# Patient Record
Sex: FEMALE | Race: BLACK OR AFRICAN AMERICAN | NOT HISPANIC OR LATINO | URBAN - METROPOLITAN AREA
[De-identification: names, ages, dates, MRNs, and addresses within clinical notes are randomized per-mention and may not be internally consistent; named-entity substitution may affect disease eponyms.]

---

## 2017-05-05 ENCOUNTER — EMERGENCY (EMERGENCY)
Facility: HOSPITAL | Age: 62
LOS: 1 days | Discharge: HOME | End: 2017-05-05
Attending: EMERGENCY MEDICINE | Admitting: EMERGENCY MEDICINE
Payer: COMMERCIAL

## 2017-05-05 VITALS
OXYGEN SATURATION: 99 % | HEART RATE: 60 BPM | TEMPERATURE: 98 F | RESPIRATION RATE: 16 BRPM | SYSTOLIC BLOOD PRESSURE: 137 MMHG | DIASTOLIC BLOOD PRESSURE: 86 MMHG

## 2017-05-05 VITALS
DIASTOLIC BLOOD PRESSURE: 76 MMHG | RESPIRATION RATE: 18 BRPM | TEMPERATURE: 98 F | HEART RATE: 65 BPM | SYSTOLIC BLOOD PRESSURE: 146 MMHG

## 2017-05-05 DIAGNOSIS — Z90.710 ACQUIRED ABSENCE OF BOTH CERVIX AND UTERUS: Chronic | ICD-10-CM

## 2017-05-05 PROCEDURE — 74010: CPT | Mod: 26

## 2017-05-05 PROCEDURE — 99285 EMERGENCY DEPT VISIT HI MDM: CPT

## 2017-05-05 PROCEDURE — 72170 X-RAY EXAM OF PELVIS: CPT | Mod: 26

## 2017-05-05 RX ORDER — DIAZEPAM 5 MG
1 TABLET ORAL
Qty: 15 | Refills: 0 | OUTPATIENT
Start: 2017-05-05

## 2017-05-05 RX ORDER — LIDOCAINE 4 G/100G
1 CREAM TOPICAL ONCE
Qty: 0 | Refills: 0 | Status: COMPLETED | OUTPATIENT
Start: 2017-05-05 | End: 2017-05-05

## 2017-05-05 RX ORDER — FAMOTIDINE 10 MG/ML
40 INJECTION INTRAVENOUS ONCE
Qty: 0 | Refills: 0 | Status: COMPLETED | OUTPATIENT
Start: 2017-05-05 | End: 2017-05-05

## 2017-05-05 RX ORDER — OMEPRAZOLE 10 MG/1
1 CAPSULE, DELAYED RELEASE ORAL
Qty: 30 | Refills: 0 | OUTPATIENT
Start: 2017-05-05 | End: 2017-06-04

## 2017-05-05 RX ORDER — IBUPROFEN 200 MG
800 TABLET ORAL ONCE
Qty: 0 | Refills: 0 | Status: DISCONTINUED | OUTPATIENT
Start: 2017-05-05 | End: 2017-05-05

## 2017-05-05 RX ORDER — ACETAMINOPHEN 500 MG
650 TABLET ORAL ONCE
Qty: 0 | Refills: 0 | Status: COMPLETED | OUTPATIENT
Start: 2017-05-05 | End: 2017-05-05

## 2017-05-05 RX ADMIN — LIDOCAINE 1 PATCH: 4 CREAM TOPICAL at 11:06

## 2017-05-05 RX ADMIN — Medication 650 MILLIGRAM(S): at 11:10

## 2017-05-05 NOTE — ED PROVIDER NOTE - DETAILS:
Dr. Cruz:  I personally performed the services described in the documentation, reviewed the documentation recorded by the scribe in my presence and it accurately and completely records my words and action. The scribe's documentation has been prepared under my direction and personally reviewed by me in its entirety. I confirm that the note above accurately reflects all work, treatment, procedures, and medical decision making performed by me.

## 2017-05-05 NOTE — ED ADULT NURSE NOTE - OBJECTIVE STATEMENT
Patient received to room 6 awake, alert, oriented x3, able to make needs known verbally.  Patient complaining of pain 9/10 in severity in right buttock radiating down leg.  Patient ambulatory free from injury.  Pain meds received as per provider order.  No sign of acute distress at this time, will continue to monitor.

## 2017-05-05 NOTE — ED PROVIDER NOTE - MEDICAL DECISION MAKING DETAILS
60 yo F pt w/ Sciatica, without warning signs for Cord Compression. Plan: pain control, reassess. As per pt and pt's family's request - x-ray to r/o fx.

## 2017-05-05 NOTE — ED PROVIDER NOTE - CARE PLAN
Principal Discharge DX:	Sciatic mononeuropathy, right  Instructions for follow-up, activity and diet:	Seen in ED for right sided sciatica. Limit further injury, over exertion, and rest affected area. Take Tylenol 650 mg every 4 hours as needed for mild to moderate pain. Take Valium 5mg one tab every 8 hours as needed for severe pain. NEVER COMBINE WITH ALCOHOL. NEVER DRINK AND DRIVE ON VALIUM IT WILL CAUSE ACCIDENTS. Please continue all home medications as directed. See your regular doctor within 72 hours for follow-up care. Return to ER for new or worsening symptoms.

## 2017-05-05 NOTE — ED PROVIDER NOTE - OBJECTIVE STATEMENT
60 yo F pt w/ PMHx of HTN, Total Hysterectomy presents to the ED c/o 5 weeks of R buttock pain down to RLE. Denies trauma, MVC, fall, history of herniated disc or back surgery. For the past 5 weeks, notes a throbbing and cramping pain in lower R buttock extending to posterior RLE up to the knee. Denies urinary/fecal incontinence. Needs no assistance with gait. Is taking antiinflammatory, with no improvement. Also c/o - for the past 4 months, when she wakes up in the morning she feels a burning sensation in the lower abdomen. No FHXx. Nonsmoker. 62 yo F pt w/ PMHx of HTN, Total Hysterectomy presents to the ED c/o 5 weeks of R buttock pain down to RLE. Denies trauma, MVC, fall, history of herniated disc or back surgery. For the past 5 weeks, notes a throbbing and cramping pain in lower R buttock extending to posterior RLE up to the knee. Denies urinary/fecal incontinence. Needs no assistance with gait. Is taking antiinflammatory, with no improvement. Also c/o - for the past 4-5 months, when she wakes up in the morning she feels a burning sensation in the mid abdomen, non at present. No FHXx. Nonsmoker.

## 2017-05-05 NOTE — ED PROVIDER NOTE - PROGRESS NOTE DETAILS
Nancy att: requested xr neg fx, pt feeling better, ambulatory. DC instructions and return precautions d/w patient.

## 2017-05-05 NOTE — ED PROVIDER NOTE - PLAN OF CARE
Seen in ED for right sided sciatica. Limit further injury, over exertion, and rest affected area. Take Tylenol 650 mg every 4 hours as needed for mild to moderate pain. Take Valium 5mg one tab every 8 hours as needed for severe pain. NEVER COMBINE WITH ALCOHOL. NEVER DRINK AND DRIVE ON VALIUM IT WILL CAUSE ACCIDENTS. Please continue all home medications as directed. See your regular doctor within 72 hours for follow-up care. Return to ER for new or worsening symptoms.

## 2018-11-30 ENCOUNTER — EMERGENCY (EMERGENCY)
Facility: HOSPITAL | Age: 63
LOS: 1 days | Discharge: HOME | End: 2018-11-30
Attending: EMERGENCY MEDICINE | Admitting: EMERGENCY MEDICINE
Payer: COMMERCIAL

## 2018-11-30 VITALS
HEART RATE: 77 BPM | TEMPERATURE: 98 F | SYSTOLIC BLOOD PRESSURE: 154 MMHG | RESPIRATION RATE: 18 BRPM | OXYGEN SATURATION: 98 % | DIASTOLIC BLOOD PRESSURE: 90 MMHG

## 2018-11-30 DIAGNOSIS — Z90.710 ACQUIRED ABSENCE OF BOTH CERVIX AND UTERUS: Chronic | ICD-10-CM

## 2018-11-30 PROBLEM — I10 ESSENTIAL (PRIMARY) HYPERTENSION: Chronic | Status: ACTIVE | Noted: 2017-05-05

## 2018-11-30 LAB
ALBUMIN SERPL ELPH-MCNC: 4.5 G/DL — SIGNIFICANT CHANGE UP (ref 3.3–5)
ALP SERPL-CCNC: 90 U/L — SIGNIFICANT CHANGE UP (ref 40–120)
ALT FLD-CCNC: 22 U/L — SIGNIFICANT CHANGE UP (ref 4–33)
APPEARANCE UR: CLEAR — SIGNIFICANT CHANGE UP
APTT BLD: 29.6 SEC — SIGNIFICANT CHANGE UP (ref 27.5–36.3)
AST SERPL-CCNC: 23 U/L — SIGNIFICANT CHANGE UP (ref 4–32)
BACTERIA # UR AUTO: NEGATIVE — SIGNIFICANT CHANGE UP
BASE EXCESS BLDV CALC-SCNC: 1.1 MMOL/L — SIGNIFICANT CHANGE UP
BASE EXCESS BLDV CALC-SCNC: 2.6 MMOL/L — SIGNIFICANT CHANGE UP
BASE EXCESS BLDV CALC-SCNC: 3.8 MMOL/L — SIGNIFICANT CHANGE UP
BASOPHILS # BLD AUTO: 0.01 K/UL — SIGNIFICANT CHANGE UP (ref 0–0.2)
BASOPHILS NFR BLD AUTO: 0.2 % — SIGNIFICANT CHANGE UP (ref 0–2)
BILIRUB SERPL-MCNC: 0.7 MG/DL — SIGNIFICANT CHANGE UP (ref 0.2–1.2)
BILIRUB UR-MCNC: NEGATIVE — SIGNIFICANT CHANGE UP
BLOOD GAS VENOUS - CREATININE: 0.6 MG/DL — SIGNIFICANT CHANGE UP (ref 0.5–1.3)
BLOOD GAS VENOUS - CREATININE: 0.63 MG/DL — SIGNIFICANT CHANGE UP (ref 0.5–1.3)
BLOOD GAS VENOUS - CREATININE: SIGNIFICANT CHANGE UP MG/DL (ref 0.5–1.3)
BLOOD UR QL VISUAL: NEGATIVE — SIGNIFICANT CHANGE UP
BUN SERPL-MCNC: 12 MG/DL — SIGNIFICANT CHANGE UP (ref 7–23)
CALCIUM SERPL-MCNC: 9.1 MG/DL — SIGNIFICANT CHANGE UP (ref 8.4–10.5)
CHLORIDE BLDV-SCNC: 106 MMOL/L — SIGNIFICANT CHANGE UP (ref 96–108)
CHLORIDE BLDV-SCNC: 106 MMOL/L — SIGNIFICANT CHANGE UP (ref 96–108)
CHLORIDE BLDV-SCNC: 111 MMOL/L — HIGH (ref 96–108)
CHLORIDE SERPL-SCNC: 101 MMOL/L — SIGNIFICANT CHANGE UP (ref 98–107)
CO2 SERPL-SCNC: 24 MMOL/L — SIGNIFICANT CHANGE UP (ref 22–31)
COLOR SPEC: COLORLESS — SIGNIFICANT CHANGE UP
CREAT SERPL-MCNC: 0.78 MG/DL — SIGNIFICANT CHANGE UP (ref 0.5–1.3)
EOSINOPHIL # BLD AUTO: 0.02 K/UL — SIGNIFICANT CHANGE UP (ref 0–0.5)
EOSINOPHIL NFR BLD AUTO: 0.4 % — SIGNIFICANT CHANGE UP (ref 0–6)
GAS PNL BLDV: 139 MMOL/L — SIGNIFICANT CHANGE UP (ref 136–146)
GAS PNL BLDV: 140 MMOL/L — SIGNIFICANT CHANGE UP (ref 136–146)
GAS PNL BLDV: 142 MMOL/L — SIGNIFICANT CHANGE UP (ref 136–146)
GLUCOSE BLDV-MCNC: 114 — HIGH (ref 70–99)
GLUCOSE BLDV-MCNC: 120 — HIGH (ref 70–99)
GLUCOSE BLDV-MCNC: 94 — SIGNIFICANT CHANGE UP (ref 70–99)
GLUCOSE SERPL-MCNC: 117 MG/DL — HIGH (ref 70–99)
GLUCOSE UR-MCNC: NEGATIVE — SIGNIFICANT CHANGE UP
HCO3 BLDV-SCNC: 24 MMOL/L — SIGNIFICANT CHANGE UP (ref 20–27)
HCO3 BLDV-SCNC: 25 MMOL/L — SIGNIFICANT CHANGE UP (ref 20–27)
HCO3 BLDV-SCNC: 27 MMOL/L — SIGNIFICANT CHANGE UP (ref 20–27)
HCT VFR BLD CALC: 38.6 % — SIGNIFICANT CHANGE UP (ref 34.5–45)
HCT VFR BLDV CALC: 34.2 % — LOW (ref 34.5–45)
HCT VFR BLDV CALC: 35.4 % — SIGNIFICANT CHANGE UP (ref 34.5–45)
HCT VFR BLDV CALC: 38.6 % — SIGNIFICANT CHANGE UP (ref 34.5–45)
HGB BLD-MCNC: 12.5 G/DL — SIGNIFICANT CHANGE UP (ref 11.5–15.5)
HGB BLDV-MCNC: 11.1 G/DL — LOW (ref 11.5–15.5)
HGB BLDV-MCNC: 11.5 G/DL — SIGNIFICANT CHANGE UP (ref 11.5–15.5)
HGB BLDV-MCNC: 12.6 G/DL — SIGNIFICANT CHANGE UP (ref 11.5–15.5)
HYALINE CASTS # UR AUTO: NEGATIVE — SIGNIFICANT CHANGE UP
IMM GRANULOCYTES # BLD AUTO: 0.04 # — SIGNIFICANT CHANGE UP
IMM GRANULOCYTES NFR BLD AUTO: 0.8 % — SIGNIFICANT CHANGE UP (ref 0–1.5)
INR BLD: 1.03 — SIGNIFICANT CHANGE UP (ref 0.88–1.17)
KETONES UR-MCNC: NEGATIVE — SIGNIFICANT CHANGE UP
LACTATE BLDV-MCNC: 1.3 MMOL/L — SIGNIFICANT CHANGE UP (ref 0.5–2)
LACTATE BLDV-MCNC: 2.2 MMOL/L — HIGH (ref 0.5–2)
LACTATE BLDV-MCNC: 3.3 MMOL/L — HIGH (ref 0.5–2)
LEUKOCYTE ESTERASE UR-ACNC: SIGNIFICANT CHANGE UP
LIDOCAIN IGE QN: 16.7 U/L — SIGNIFICANT CHANGE UP (ref 7–60)
LYMPHOCYTES # BLD AUTO: 1.83 K/UL — SIGNIFICANT CHANGE UP (ref 1–3.3)
LYMPHOCYTES # BLD AUTO: 38.4 % — SIGNIFICANT CHANGE UP (ref 13–44)
MCHC RBC-ENTMCNC: 29.7 PG — SIGNIFICANT CHANGE UP (ref 27–34)
MCHC RBC-ENTMCNC: 32.4 % — SIGNIFICANT CHANGE UP (ref 32–36)
MCV RBC AUTO: 91.7 FL — SIGNIFICANT CHANGE UP (ref 80–100)
MONOCYTES # BLD AUTO: 0.38 K/UL — SIGNIFICANT CHANGE UP (ref 0–0.9)
MONOCYTES NFR BLD AUTO: 8 % — SIGNIFICANT CHANGE UP (ref 2–14)
NEUTROPHILS # BLD AUTO: 2.48 K/UL — SIGNIFICANT CHANGE UP (ref 1.8–7.4)
NEUTROPHILS NFR BLD AUTO: 52.2 % — SIGNIFICANT CHANGE UP (ref 43–77)
NITRITE UR-MCNC: NEGATIVE — SIGNIFICANT CHANGE UP
NRBC # FLD: 0 — SIGNIFICANT CHANGE UP
PCO2 BLDV: 39 MMHG — LOW (ref 41–51)
PCO2 BLDV: 54 MMHG — HIGH (ref 41–51)
PCO2 BLDV: 55 MMHG — HIGH (ref 41–51)
PH BLDV: 7.31 PH — LOW (ref 7.32–7.43)
PH BLDV: 7.34 PH — SIGNIFICANT CHANGE UP (ref 7.32–7.43)
PH BLDV: 7.46 PH — HIGH (ref 7.32–7.43)
PH UR: 8.5 — HIGH (ref 5–8)
PLATELET # BLD AUTO: 213 K/UL — SIGNIFICANT CHANGE UP (ref 150–400)
PMV BLD: 10.5 FL — SIGNIFICANT CHANGE UP (ref 7–13)
PO2 BLDV: 29 MMHG — LOW (ref 35–40)
PO2 BLDV: 31 MMHG — LOW (ref 35–40)
PO2 BLDV: 33 MMHG — LOW (ref 35–40)
POTASSIUM BLDV-SCNC: 3.1 MMOL/L — LOW (ref 3.4–4.5)
POTASSIUM BLDV-SCNC: 3.1 MMOL/L — LOW (ref 3.4–4.5)
POTASSIUM BLDV-SCNC: 3.7 MMOL/L — SIGNIFICANT CHANGE UP (ref 3.4–4.5)
POTASSIUM SERPL-MCNC: 3.3 MMOL/L — LOW (ref 3.5–5.3)
POTASSIUM SERPL-SCNC: 3.3 MMOL/L — LOW (ref 3.5–5.3)
PROT SERPL-MCNC: 8.3 G/DL — SIGNIFICANT CHANGE UP (ref 6–8.3)
PROT UR-MCNC: NEGATIVE — SIGNIFICANT CHANGE UP
PROTHROM AB SERPL-ACNC: 11.5 SEC — SIGNIFICANT CHANGE UP (ref 9.8–13.1)
RBC # BLD: 4.21 M/UL — SIGNIFICANT CHANGE UP (ref 3.8–5.2)
RBC # FLD: 13.7 % — SIGNIFICANT CHANGE UP (ref 10.3–14.5)
RBC CASTS # UR COMP ASSIST: SIGNIFICANT CHANGE UP (ref 0–?)
SAO2 % BLDV: 39 % — LOW (ref 60–85)
SAO2 % BLDV: 54.5 % — LOW (ref 60–85)
SAO2 % BLDV: 54.8 % — LOW (ref 60–85)
SODIUM SERPL-SCNC: 141 MMOL/L — SIGNIFICANT CHANGE UP (ref 135–145)
SP GR SPEC: 1.01 — SIGNIFICANT CHANGE UP (ref 1–1.04)
SQUAMOUS # UR AUTO: SIGNIFICANT CHANGE UP
UROBILINOGEN FLD QL: NORMAL — SIGNIFICANT CHANGE UP
WBC # BLD: 4.76 K/UL — SIGNIFICANT CHANGE UP (ref 3.8–10.5)
WBC # FLD AUTO: 4.76 K/UL — SIGNIFICANT CHANGE UP (ref 3.8–10.5)
WBC UR QL: SIGNIFICANT CHANGE UP (ref 0–?)

## 2018-11-30 PROCEDURE — 74174 CTA ABD&PLVS W/CONTRAST: CPT | Mod: 26

## 2018-11-30 PROCEDURE — 74019 RADEX ABDOMEN 2 VIEWS: CPT | Mod: 26

## 2018-11-30 PROCEDURE — 99218: CPT

## 2018-11-30 RX ORDER — SODIUM CHLORIDE 9 MG/ML
1000 INJECTION INTRAMUSCULAR; INTRAVENOUS; SUBCUTANEOUS ONCE
Qty: 0 | Refills: 0 | Status: COMPLETED | OUTPATIENT
Start: 2018-11-30 | End: 2018-11-30

## 2018-11-30 RX ORDER — FAMOTIDINE 10 MG/ML
20 INJECTION INTRAVENOUS EVERY 12 HOURS
Qty: 0 | Refills: 0 | Status: DISCONTINUED | OUTPATIENT
Start: 2018-11-30 | End: 2018-12-04

## 2018-11-30 RX ORDER — MORPHINE SULFATE 50 MG/1
4 CAPSULE, EXTENDED RELEASE ORAL ONCE
Qty: 0 | Refills: 0 | Status: DISCONTINUED | OUTPATIENT
Start: 2018-11-30 | End: 2018-11-30

## 2018-11-30 RX ORDER — SODIUM CHLORIDE 9 MG/ML
1000 INJECTION, SOLUTION INTRAVENOUS
Qty: 0 | Refills: 0 | Status: DISCONTINUED | OUTPATIENT
Start: 2018-11-30 | End: 2018-12-04

## 2018-11-30 RX ORDER — METOCLOPRAMIDE HCL 10 MG
10 TABLET ORAL ONCE
Qty: 0 | Refills: 0 | Status: COMPLETED | OUTPATIENT
Start: 2018-11-30 | End: 2018-11-30

## 2018-11-30 RX ORDER — ONDANSETRON 8 MG/1
4 TABLET, FILM COATED ORAL ONCE
Qty: 0 | Refills: 0 | Status: COMPLETED | OUTPATIENT
Start: 2018-11-30 | End: 2018-11-30

## 2018-11-30 RX ORDER — ONDANSETRON 8 MG/1
4 TABLET, FILM COATED ORAL EVERY 4 HOURS
Qty: 0 | Refills: 0 | Status: DISCONTINUED | OUTPATIENT
Start: 2018-11-30 | End: 2018-12-04

## 2018-11-30 RX ORDER — POTASSIUM CHLORIDE 20 MEQ
40 PACKET (EA) ORAL ONCE
Qty: 0 | Refills: 0 | Status: COMPLETED | OUTPATIENT
Start: 2018-11-30 | End: 2018-11-30

## 2018-11-30 RX ORDER — SIMETHICONE 80 MG/1
80 TABLET, CHEWABLE ORAL ONCE
Qty: 0 | Refills: 0 | Status: COMPLETED | OUTPATIENT
Start: 2018-11-30 | End: 2018-11-30

## 2018-11-30 RX ORDER — ACETAMINOPHEN 500 MG
1000 TABLET ORAL ONCE
Qty: 0 | Refills: 0 | Status: COMPLETED | OUTPATIENT
Start: 2018-11-30 | End: 2018-11-30

## 2018-11-30 RX ORDER — FAMOTIDINE 10 MG/ML
20 INJECTION INTRAVENOUS ONCE
Qty: 0 | Refills: 0 | Status: DISCONTINUED | OUTPATIENT
Start: 2018-11-30 | End: 2018-11-30

## 2018-11-30 RX ORDER — FAMOTIDINE 10 MG/ML
20 INJECTION INTRAVENOUS ONCE
Qty: 0 | Refills: 0 | Status: COMPLETED | OUTPATIENT
Start: 2018-11-30 | End: 2018-11-30

## 2018-11-30 RX ADMIN — SIMETHICONE 80 MILLIGRAM(S): 80 TABLET, CHEWABLE ORAL at 19:05

## 2018-11-30 RX ADMIN — SODIUM CHLORIDE 1000 MILLILITER(S): 9 INJECTION INTRAMUSCULAR; INTRAVENOUS; SUBCUTANEOUS at 13:13

## 2018-11-30 RX ADMIN — Medication 400 MILLIGRAM(S): at 23:42

## 2018-11-30 RX ADMIN — MORPHINE SULFATE 4 MILLIGRAM(S): 50 CAPSULE, EXTENDED RELEASE ORAL at 09:10

## 2018-11-30 RX ADMIN — SODIUM CHLORIDE 1000 MILLILITER(S): 9 INJECTION INTRAMUSCULAR; INTRAVENOUS; SUBCUTANEOUS at 09:56

## 2018-11-30 RX ADMIN — ONDANSETRON 4 MILLIGRAM(S): 8 TABLET, FILM COATED ORAL at 08:49

## 2018-11-30 RX ADMIN — Medication 30 MILLILITER(S): at 08:49

## 2018-11-30 RX ADMIN — MORPHINE SULFATE 4 MILLIGRAM(S): 50 CAPSULE, EXTENDED RELEASE ORAL at 11:35

## 2018-11-30 RX ADMIN — MORPHINE SULFATE 4 MILLIGRAM(S): 50 CAPSULE, EXTENDED RELEASE ORAL at 08:48

## 2018-11-30 RX ADMIN — Medication 10 MILLIGRAM(S): at 19:05

## 2018-11-30 RX ADMIN — SODIUM CHLORIDE 150 MILLILITER(S): 9 INJECTION, SOLUTION INTRAVENOUS at 15:08

## 2018-11-30 RX ADMIN — MORPHINE SULFATE 4 MILLIGRAM(S): 50 CAPSULE, EXTENDED RELEASE ORAL at 11:50

## 2018-11-30 RX ADMIN — FAMOTIDINE 20 MILLIGRAM(S): 10 INJECTION INTRAVENOUS at 08:46

## 2018-11-30 RX ADMIN — SODIUM CHLORIDE 1000 MILLILITER(S): 9 INJECTION INTRAMUSCULAR; INTRAVENOUS; SUBCUTANEOUS at 08:48

## 2018-11-30 RX ADMIN — Medication 40 MILLIEQUIVALENT(S): at 10:18

## 2018-11-30 NOTE — CONSULT NOTE ADULT - ASSESSMENT
63F PMH HTN, s/p hysterectomy, appendectomy (Both over 20 years ago), presenting with abdominal pain, found on imaging to have ileus vs. partial SBO.    - Patient now passing flatus, denying nausea - no indication for NGT at the time  - Recommend rechecking Lactate after further IVF resuscitation  - If Lactate clears would PO challenge and discharge home if tolerating  - Please notify General surgery if pt fails PO challenge/ has increasing pain/Nausea in the interim  - Discussed with attending Dr. Elida Lemus PGY2  B team Surgery  [id number]

## 2018-11-30 NOTE — ED CDU PROVIDER INITIAL DAY NOTE - PROGRESS NOTE DETAILS
Improving abd pain c/o, soft abd, NT to palpation, points to periumbilical area, no amrita/guarding. Pt. states passing gas but unlikely to have bm in ED, no N/V, will attempt po trial. Pt. with inc. nausea, low abd cramping and retching with po liquids. Will stop po trial and reassess, poss. Surgery re-eval. This patient was signed out to me by day CDU KAVITHA Morales and attending Dr. Dhillon at 1900 hrs.; test results reviewed.  At time of sign-out, pt was retching and belching frequently; was s/p PO trial.  Pt. was given Reglan and simethicone during sign-out, and pt was observed to cease retching and frequency of belching decreased.  In the interim, pt objectively noted to be resting, with NAD; abdominal x-ray was performed which shows dilated bowel in the right abdomen and an air-fluid level in the LUQ.  I reassessed pt on return from x-ray; pt states she still feels non-radiating mid abdominal pain, 6/10 in severity, and states unchanged from prior.  On exam, pt has hypoactive BS throughout, abdomen appears mildly protuberant, abdomen is soft with no objective (or subjective) TTP on either light or deep palpation.  No pulsations or palpable masses detected.  I discussed the case with the surgical resident answering the [phone] B team surgery pager; she advised to give IV Tylenol for pain and stated she will discuss with the team that evaluated the patient earlier and get back to me.  Pt. is stable at the present time.  I discussed all of the above with the patient.  Will continue to monitor.  Pt. will be kept NPO for now. Pt was reassessed bedside by the surgical team; surgical resident advised continued monitoring overnight; team will reassess pt in am; agrees with current CDU plan.  Pt. stable at present; objectively comfortable appearing at present.

## 2018-11-30 NOTE — ED CDU PROVIDER INITIAL DAY NOTE - MEDICAL DECISION MAKING DETAILS
64 y/o female abd pain x1 day, CT concerning for ileus vs partial SBO- pain control, fluids, PO trial, reassess

## 2018-11-30 NOTE — ED PROVIDER NOTE - PROGRESS NOTE DETAILS
Javier: surgery consulted. Javier: surgery recommending fluids and repeat lactate, po challenge. Spoke with CDU regarding observation, CDU PA will come assess patient.

## 2018-11-30 NOTE — ED CDU PROVIDER INITIAL DAY NOTE - OBJECTIVE STATEMENT
62 y/o female pmh htn, pshx appendectomy, hysterectomy, c/o abd pain x1 day. Pt admits to sudden onset abd pain which woke her up from sleep. Pt admits to constant, mid abd pain, non radiating, no aggravating or relieving factors. Pt admits to nausea. Pt admits to having small BM yesterday, last normal BM x2 days ago. Pt admits to passing gas today. pt denies chest pain, sob, vomiting, numbness, tingling, weakness, dizziness, syncope, fever or chills.  Pt sen and examined by surgery in ER for CT findings of ileus vs partial SBO. Pt is non tender with no emesis and passing flatus. Pt sent to CDU for serial abd exams, IV fluids, repeat labs and PO trial

## 2018-11-30 NOTE — ED PROVIDER NOTE - PHYSICAL EXAMINATION
GENERAL: in distress, moaning in pain, but responding to questions appropriately  HEAD: NCAT  HEENT: Normal conjunctiva, oral mucosa moist  CARDIAC: RRR, no murmurs  PULM: CTAB, no crackles, rales, rhonchi, or wheezing  GI: Abdomen nondistended, soft, nontender, no guarding or rebound tenderness; + BS  NEURO: AAO x 3, PERRLA, EOMI, no focal motor or sensory deficits  MSK: Moving 4 extremities, no visible deformities  SKIN: No visible rashes, dry, well-perfused  PYSCH: Appropriate mood and affect

## 2018-11-30 NOTE — CONSULT NOTE ADULT - SUBJECTIVE AND OBJECTIVE BOX
General Surgery Consult  Consulting surgical team: B team surgery  Consulting attending: Dr. Marrero    HPI:  63F PMH HTN, s/p hysterectomy, appendectomy (Both over 20 years ago), presenting with abdominal pain. Patient said she developed abdominal pain last night while she was sitting and watching TV. Has never had pain like this before. Pain worse in the lower abdomen, and gradually worsened overnight, becoming most severe this morning and prompting presentation to [facility] ED. Endorses having nausea at home but nothing since presentation. Denies emesis. Last BM was yesterday, which was smaller in size than usual (of note, patient regularly takes Metamucil at home). Denies fevers, chills.     Upon arrival to [facility] ED, AVSS. WBC 4.76. Lactate 3.3, which improved to 2.2 after 1L NS bolus. CT Angio as obtained due to concern for mesenteric ischemia, which was negative for ischemia but showed ileus vs. partial SBO with possible transition point in RUQ. At time of exam pt passing flatus.     PAST MEDICAL HISTORY:  HTN (hypertension)      PAST SURGICAL HISTORY:  History of total hysterectomy  Appendectomy      MEDICATIONS:      ALLERGIES:  No Known Allergies      VITALS & I/Os:  Vital Signs Last 24 Hrs  T(C): 36.7 (2018 11:37), Max: 36.7 (2018 11:37)  T(F): 98 (2018 11:37), Max: 98 (2018 11:37)  HR: 78 (2018 11:37) (74 - 78)  BP: 145/82 (2018 11:37) (119/66 - 154/90)  BP(mean): --  RR: 17 (2018 11:37) (17 - 18)  SpO2: 100% (2018 11:37) (98% - 100%)    I&O's Summary      PHYSICAL EXAM:  General: Laying in bed, in NAD  Respiratory: Nonlabored  Cardiovascular: RRR  Abdominal: Soft, nondistended. Mildly TTP over lower abdomen.  Extremities: Warm    LABS:                        12.5   4.76  )-----------( 213      ( 2018 08:30 )             38.6     11-30    141  |  101  |  12  ----------------------------<  117<H>  3.3<L>   |  24  |  0.78    Ca    9.1      2018 08:30    TPro  8.3  /  Alb  4.5  /  TBili  0.7  /  DBili  x   /  AST  23  /  ALT  22  /  AlkPhos  90      Lactate:   @ 10:19  2.2   @ 08:30  3.3    PT/INR - ( 2018 08:30 )   PT: 11.5 SEC;   INR: 1.03          PTT - ( 2018 08:30 )  PTT:29.6 SEC          Urinalysis Basic - ( 2018 10:19 )    Color: COLORLESS / Appearance: CLEAR / S.014 / pH: 8.5  Gluc: NEGATIVE / Ketone: NEGATIVE  / Bili: NEGATIVE / Urobili: NORMAL   Blood: NEGATIVE / Protein: NEGATIVE / Nitrite: NEGATIVE   Leuk Esterase: TRACE / RBC: 0-2 / WBC 0-2   Sq Epi: FEW / Non Sq Epi: x / Bacteria: NEGATIVE        IMAGING:  CT Angio Abdomen and Pelvis w/ IV Cont (18 @ 09:48)  IMPRESSION:     No acute pathology.    *  The celiac axis, SMA, MARTHA, bilateral renal arteries are patent. The   SMV, hepatic veins and portal vein are patent. No evidence of mesenteric   ischemia.  *  Dilatation of proximal jejunal loops with possible transition point in   the right upper abdomen. Findings are concerning for localized ileus   versus partial small bowel obstruction.

## 2018-11-30 NOTE — ED PROVIDER NOTE - ATTENDING CONTRIBUTION TO CARE
Herbie: 64 yo female with a h/o HTN c/o periumbilical abdominal pain that began last night after eating and has continued to worsen overnight/ + associated nausea but no vomiting. NO diarrhea. Last BM 2 days ago. No fevers or chills. NO dysuria or flank pain. No associated chest pain or SOB. No weakness or paresthesias in extremities. Exam: GENERAL: well appearing, NAD, HEENT: MMM,  no scleral icterus, CARDIO: +S1/S2, no murmurs, rubs or gallops, LUNGS: CTA B/L, no wheezing, rales or rhonchi, ABD: soft, nontender, BSx4 quadrants, no guarding or rigidity. EXT: No LE edema or calf TTP, 2+ distal pulses x 4 extremities. NEURO: AxOx3, SKIN: no rashes or lesions, well perfused A/P- 64 yo female with pain out of proportion to abdominal exam. Pt writhing around in pain but abdomen is soft and nontender. will obtain cbc, cmp, lipase, lactate on VBG elevated will give IVF and repeat. will also obtain CTA abdomen to r/o mesenteric ischemia.

## 2018-11-30 NOTE — ED PROVIDER NOTE - MEDICAL DECISION MAKING DETAILS
63F with hx of HTN, hysterectomy, appendectomy presenting with nausea and supraumbilical abdominal pain for the past 5 hours. Ddx - SBO, gastroenteritis, AAA. Ruptured AAA less likely given normal BP, and lack of tenderness. Plan - basics, lipase, ct a/p, fluids, sx management.

## 2018-11-30 NOTE — ED ADULT NURSE NOTE - OBJECTIVE STATEMENT
Pt received in rm 14, AOx3, c/o abd pain Pt received in rm 14, AOx3, c/o abd pain mid-umbilical 10/10 non-radiating . Pt states the pain started last night and has had nausea with no vomiting or diarrhea. Pt appears very uncomfortable. Abd soft, nondistended or tender on palpation. Pt denies any recent travel or sick contact. Hx appendectomy and hysterectomy. IV access obtained 20G Rt AC. Patient medicated as ordered, vitals as noted, waiting on CT angio. Pt received in rm 14, AOx3, c/o abd pain mid-umbilical 10/10 non-radiating . Pt states the pain started last night and has had nausea with no vomiting or diarrhea. Pt appears very uncomfortable. Abd soft, nondistended, nontender on palpation. Pt denies any recent travel or sick contact. Hx appendectomy and hysterectomy. IV access obtained 20G Rt AC. Patient medicated as ordered, vitals as noted, waiting on CT angio.

## 2018-11-30 NOTE — ED PROVIDER NOTE - NS ED ROS FT
GENERAL: no fever, chills  HEENT: no throat pain, cough, congestion, dysphagia  CARDIAC: no chest pain, palpitations  PULM: no shortness of breath, cough, wheezing   GI: + abdominal pain, nausea, no vomiting, diarrhea, constipation  : no dysuria, frequency  NEURO: no headache, lightheadedness  MSK: no joint or muscle pain  SKIN: no rashes  HEME: no active bleeding

## 2018-11-30 NOTE — ED ADULT TRIAGE NOTE - CHIEF COMPLAINT QUOTE
Pt arrives by [facility] c/o abdominal pain, mid-umbilical w/ nausea, no vomiting or diarrhea. Reports last BM yesterday ( 1 day ago), denies recent illness/sick contact/travel. Hx Appendectomy, Hysterectomy years ago.  Appearing uncomfortable, tearful in triage.

## 2018-11-30 NOTE — ED PROVIDER NOTE - OBJECTIVE STATEMENT
63F with hx of HTN, hysterectomy, appendectomy presenting with nausea and supraumbilical abdominal pain for the past 5 hours. Pain started abruptly while patient was sleeping, constant, nonradiating, sharp in nature. Patient had a full BM two days ago, has not gone since. Denies passing flatus for the past day. Patient endorses nausea, but no vomiting. Denies fever, chills, hx of SBO, dysuria, frequency, melena, hematochezia. Patient had plaintains for dinner last night, believes she might have gotten sick because they were not ripe yet. No one else who ate that is sick also.

## 2018-12-01 VITALS
SYSTOLIC BLOOD PRESSURE: 126 MMHG | OXYGEN SATURATION: 100 % | RESPIRATION RATE: 18 BRPM | DIASTOLIC BLOOD PRESSURE: 78 MMHG | TEMPERATURE: 98 F | HEART RATE: 66 BPM

## 2018-12-01 DIAGNOSIS — Z90.49 ACQUIRED ABSENCE OF OTHER SPECIFIED PARTS OF DIGESTIVE TRACT: Chronic | ICD-10-CM

## 2018-12-01 LAB
ALBUMIN SERPL ELPH-MCNC: 4 G/DL — SIGNIFICANT CHANGE UP (ref 3.3–5)
ALP SERPL-CCNC: 77 U/L — SIGNIFICANT CHANGE UP (ref 40–120)
ALT FLD-CCNC: 17 U/L — SIGNIFICANT CHANGE UP (ref 4–33)
AST SERPL-CCNC: 18 U/L — SIGNIFICANT CHANGE UP (ref 4–32)
BASE EXCESS BLDV CALC-SCNC: 1.8 MMOL/L — SIGNIFICANT CHANGE UP
BASOPHILS # BLD AUTO: 0.01 K/UL — SIGNIFICANT CHANGE UP (ref 0–0.2)
BASOPHILS NFR BLD AUTO: 0.2 % — SIGNIFICANT CHANGE UP (ref 0–2)
BILIRUB SERPL-MCNC: 0.7 MG/DL — SIGNIFICANT CHANGE UP (ref 0.2–1.2)
BLOOD GAS VENOUS - CREATININE: 0.68 MG/DL — SIGNIFICANT CHANGE UP (ref 0.5–1.3)
BUN SERPL-MCNC: 10 MG/DL — SIGNIFICANT CHANGE UP (ref 7–23)
BUN SERPL-MCNC: 9 MG/DL — SIGNIFICANT CHANGE UP (ref 7–23)
CALCIUM SERPL-MCNC: 8.4 MG/DL — SIGNIFICANT CHANGE UP (ref 8.4–10.5)
CALCIUM SERPL-MCNC: 8.8 MG/DL — SIGNIFICANT CHANGE UP (ref 8.4–10.5)
CHLORIDE BLDV-SCNC: 108 MMOL/L — SIGNIFICANT CHANGE UP (ref 96–108)
CHLORIDE SERPL-SCNC: 102 MMOL/L — SIGNIFICANT CHANGE UP (ref 98–107)
CHLORIDE SERPL-SCNC: 106 MMOL/L — SIGNIFICANT CHANGE UP (ref 98–107)
CO2 SERPL-SCNC: 22 MMOL/L — SIGNIFICANT CHANGE UP (ref 22–31)
CO2 SERPL-SCNC: 25 MMOL/L — SIGNIFICANT CHANGE UP (ref 22–31)
CREAT SERPL-MCNC: 0.75 MG/DL — SIGNIFICANT CHANGE UP (ref 0.5–1.3)
CREAT SERPL-MCNC: 0.8 MG/DL — SIGNIFICANT CHANGE UP (ref 0.5–1.3)
EOSINOPHIL # BLD AUTO: 0.03 K/UL — SIGNIFICANT CHANGE UP (ref 0–0.5)
EOSINOPHIL NFR BLD AUTO: 0.6 % — SIGNIFICANT CHANGE UP (ref 0–6)
GAS PNL BLDV: 137 MMOL/L — SIGNIFICANT CHANGE UP (ref 136–146)
GLUCOSE BLDV-MCNC: 141 — HIGH (ref 70–99)
GLUCOSE SERPL-MCNC: 119 MG/DL — HIGH (ref 70–99)
GLUCOSE SERPL-MCNC: 132 MG/DL — HIGH (ref 70–99)
HBA1C BLD-MCNC: 5.7 % — HIGH (ref 4–5.6)
HCO3 BLDV-SCNC: 25 MMOL/L — SIGNIFICANT CHANGE UP (ref 20–27)
HCT VFR BLD CALC: 35.9 % — SIGNIFICANT CHANGE UP (ref 34.5–45)
HCT VFR BLDV CALC: 51.1 % — HIGH (ref 34.5–45)
HGB BLD-MCNC: 11.7 G/DL — SIGNIFICANT CHANGE UP (ref 11.5–15.5)
HGB BLDV-MCNC: 16.7 G/DL — HIGH (ref 11.5–15.5)
IMM GRANULOCYTES # BLD AUTO: 0.01 # — SIGNIFICANT CHANGE UP
IMM GRANULOCYTES NFR BLD AUTO: 0.2 % — SIGNIFICANT CHANGE UP (ref 0–1.5)
LACTATE BLDV-MCNC: 1.9 MMOL/L — SIGNIFICANT CHANGE UP (ref 0.5–2)
LYMPHOCYTES # BLD AUTO: 1.61 K/UL — SIGNIFICANT CHANGE UP (ref 1–3.3)
LYMPHOCYTES # BLD AUTO: 34.3 % — SIGNIFICANT CHANGE UP (ref 13–44)
MAGNESIUM SERPL-MCNC: 2.2 MG/DL — SIGNIFICANT CHANGE UP (ref 1.6–2.6)
MAGNESIUM SERPL-MCNC: 2.3 MG/DL — SIGNIFICANT CHANGE UP (ref 1.6–2.6)
MCHC RBC-ENTMCNC: 29.9 PG — SIGNIFICANT CHANGE UP (ref 27–34)
MCHC RBC-ENTMCNC: 32.6 % — SIGNIFICANT CHANGE UP (ref 32–36)
MCV RBC AUTO: 91.8 FL — SIGNIFICANT CHANGE UP (ref 80–100)
MONOCYTES # BLD AUTO: 0.28 K/UL — SIGNIFICANT CHANGE UP (ref 0–0.9)
MONOCYTES NFR BLD AUTO: 6 % — SIGNIFICANT CHANGE UP (ref 2–14)
NEUTROPHILS # BLD AUTO: 2.75 K/UL — SIGNIFICANT CHANGE UP (ref 1.8–7.4)
NEUTROPHILS NFR BLD AUTO: 58.7 % — SIGNIFICANT CHANGE UP (ref 43–77)
NRBC # FLD: 0 — SIGNIFICANT CHANGE UP
PCO2 BLDV: 45 MMHG — SIGNIFICANT CHANGE UP (ref 41–51)
PH BLDV: 7.38 PH — SIGNIFICANT CHANGE UP (ref 7.32–7.43)
PLATELET # BLD AUTO: 205 K/UL — SIGNIFICANT CHANGE UP (ref 150–400)
PMV BLD: 10.4 FL — SIGNIFICANT CHANGE UP (ref 7–13)
PO2 BLDV: 52 MMHG — HIGH (ref 35–40)
POTASSIUM BLDV-SCNC: 2.9 MMOL/L — CRITICAL LOW (ref 3.4–4.5)
POTASSIUM SERPL-MCNC: 3.1 MMOL/L — LOW (ref 3.5–5.3)
POTASSIUM SERPL-MCNC: 3.6 MMOL/L — SIGNIFICANT CHANGE UP (ref 3.5–5.3)
POTASSIUM SERPL-SCNC: 3.1 MMOL/L — LOW (ref 3.5–5.3)
POTASSIUM SERPL-SCNC: 3.6 MMOL/L — SIGNIFICANT CHANGE UP (ref 3.5–5.3)
PROT SERPL-MCNC: 7.7 G/DL — SIGNIFICANT CHANGE UP (ref 6–8.3)
RBC # BLD: 3.91 M/UL — SIGNIFICANT CHANGE UP (ref 3.8–5.2)
RBC # FLD: 14 % — SIGNIFICANT CHANGE UP (ref 10.3–14.5)
SAO2 % BLDV: 81.8 % — SIGNIFICANT CHANGE UP (ref 60–85)
SODIUM SERPL-SCNC: 139 MMOL/L — SIGNIFICANT CHANGE UP (ref 135–145)
SODIUM SERPL-SCNC: 140 MMOL/L — SIGNIFICANT CHANGE UP (ref 135–145)
WBC # BLD: 4.69 K/UL — SIGNIFICANT CHANGE UP (ref 3.8–10.5)
WBC # FLD AUTO: 4.69 K/UL — SIGNIFICANT CHANGE UP (ref 3.8–10.5)

## 2018-12-01 PROCEDURE — 99217: CPT

## 2018-12-01 PROCEDURE — 76705 ECHO EXAM OF ABDOMEN: CPT | Mod: 26

## 2018-12-01 RX ORDER — POTASSIUM CHLORIDE 20 MEQ
10 PACKET (EA) ORAL ONCE
Qty: 0 | Refills: 0 | Status: COMPLETED | OUTPATIENT
Start: 2018-12-01 | End: 2018-12-01

## 2018-12-01 RX ORDER — POTASSIUM CHLORIDE 20 MEQ
40 PACKET (EA) ORAL ONCE
Qty: 0 | Refills: 0 | Status: COMPLETED | OUTPATIENT
Start: 2018-12-01 | End: 2018-12-01

## 2018-12-01 RX ADMIN — SODIUM CHLORIDE 150 MILLILITER(S): 9 INJECTION, SOLUTION INTRAVENOUS at 07:33

## 2018-12-01 RX ADMIN — Medication 40 MILLIEQUIVALENT(S): at 10:53

## 2018-12-01 RX ADMIN — FAMOTIDINE 20 MILLIGRAM(S): 10 INJECTION INTRAVENOUS at 06:11

## 2018-12-01 RX ADMIN — Medication 100 MILLIEQUIVALENT(S): at 11:20

## 2018-12-01 NOTE — ED CDU PROVIDER DISPOSITION NOTE - CLINICAL COURSE
64 yo F, sent to the CDU for serial abdominal exams and PO challenge.  Patient presented to the ED c/o abdominal pain and N/V.  CTA abdomen demonstrated patent abdominal vasculature, and a questionable partial SBO vs ileus, w/o any signs of bowel injury.  She had normal VS, and normal labs, w/exception of a slightly elevated lactate (3.3) which cleared s/p IVF.  However, she was having difficulty tolerating POs, and sent to CDU for serial exams and observation. While in the CDU she has been pain-free, +BM, and normal labs this AM.  VS: wnl, and abdominal exam unremarkable.  Patient ate breakfast uneventfully.  Impression:  abdominal pain of unclear etiology vs resolved ileus. Patient clinically well and tolerating POs as well as passing BMs.  Plan: d/c home, strict return precautions (abdominal pain, PO intolerance, f/c, N/V, or any other concerns), f/u PMD.

## 2018-12-01 NOTE — PROGRESS NOTE ADULT - ATTENDING COMMENTS
I agree with the above note.    Mina Marrero MD (Cell: [phone])  Acute and Critical Care Surgery    The Acute Care Surgery (B Team) Attending Group Practice:  Dr. Mamadou Rodríguez, Dr. Tod Rodriguez, Dr. Cuco Caceres, Dr. Rachael Nieto, Dr. Mina Marrero    Urgent issues - spectra [phone] or [phone]  Nonurgent issues - [phone]  Patient appointments or after hours - [phone]

## 2018-12-01 NOTE — PROGRESS NOTE ADULT - ASSESSMENT
63F PMH HTN, s/p hysterectomy, appendectomy (Both over 20 years ago), presenting with abdominal pain, found on imaging to have ileus vs. partial SBO.  RUQ U/S showed no cholelithiasis    -start regular diet  -if tolerates diet, cleared to go home  -no surgical intervention at this time  - Discussed with attending Dr. Marrero

## 2018-12-01 NOTE — ED CDU PROVIDER SUBSEQUENT DAY NOTE - GASTROINTESTINAL, MLM
Abdomen soft, subjectively and objectively non-tender on exam to both light and deep palpation, no rebound, no guarding.

## 2018-12-01 NOTE — ED CDU PROVIDER DISPOSITION NOTE - NSFOLLOWUPINSTRUCTIONS_ED_ALL_ED_FT
Advance activity as tolerated.  Continue all previously prescribed medications as directed unless otherwise instructed.  Take Zofran 4 mg ODT every 8 hours as needed for nausea and vomiting. Take Pepcid 20 mg twice a day as needed for indigestion. Take Tylenol 650mg (Two 325 mg pills) every 4-6 hours as needed for pain or fevers. Follow up with your primary care physician, general surgery and gastroenterology (referral list provided) in 48-72 hours- bring copies of your results.  Return to the ER for worsening or persistent symptoms, including but not limited to worsening/persistent pain, vomiting, fevers, abdominal distension, inability to pass gas or have bowel movments, and/or ANY NEW OR CONCERNING SYMPTOMS. If you have issues obtaining follow up, please call: [phone] to obtain a doctor or specialist who takes your insurance in your area.  You may call [phone] to make an appointment with the internal medicine clinic.

## 2018-12-01 NOTE — ED CDU PROVIDER SUBSEQUENT DAY NOTE - SEVERE SEPSIS ALERT DETAILS
MD Kearns: patient has never had any evidence of sepsis.  Initial lactate cleared.  No signs of infection.  Normal VS.

## 2018-12-01 NOTE — ED CDU PROVIDER DISPOSITION NOTE - ATTENDING CONTRIBUTION TO CARE
MD Kearns:  I have personally performed a face to face diagnostic evaluation on this patient with the PA.  I have reviewed the ACP note and agree with the history, exam, and plan of care, except as noted.  History and Exam by me shows a 64 yo F, sent to the CDU for serial abdominal exams and PO challenge.  Patient presented to the ED c/o abdominal pain and N/V.  CTA abdomen demonstrated patent abdominal vasculature, and a questionable partial SBO vs ileus, w/o any signs of bowel injury.  She had normal VS, and normal labs, w/exception of a slightly elevated lactate (3.3) which cleared s/p IVF.  However, she was having difficulty tolerating POs, and sent to CDU for serial exams and observation. While in the CDU she has been pain-free, +BM, and normal labs this AM.  VS: wnl, and abdominal exam unremarkable.  Patient ate breakfast uneventfully.  Impression:  abdominal pain of unclear etiology vs resolved ileus. Patient clinically well and tolerating POs as well as passing BMs.  Plan: d/c home, strict return precautions (abdominal pain, PO intolerance, f/c, N/V, or any other concerns), f/u PMD.

## 2018-12-01 NOTE — ED CDU PROVIDER SUBSEQUENT DAY NOTE - PROGRESS NOTE DETAILS
3 yo F, sent to the CDU for serial abdominal exams and PO challenge.  Patient presented to the ED c/o abdominal pain and N/V.  CTA abdomen demonstrated patent abdominal vasculature, and a questionable partial SBO vs ileus.  She had normal VS, and normal labs, w/exception of a slightly elevated lactate (3.3) which cleared s/p IVF.  However, she was having difficulty tolerating POs.  While in the CDU she has been pain-free, +BM, and normal labs this AM.  VS: wnl, and abdominal exam unremarkable.  Impression:  while her physical exam and +BM is encouraging for resolution of her intraabdominal process she has yet to PO challenge.  Plan:  PO challenge, reassessment by Gen Surg, potassium replacement. 64 yo F, sent to the CDU for serial abdominal exams and PO challenge.  Patient presented to the ED c/o abdominal pain and N/V.  CTA abdomen demonstrated patent abdominal vasculature, and a questionable partial SBO vs ileus.  She had normal VS, and normal labs, w/exception of a slightly elevated lactate (3.3) which cleared s/p IVF.  However, she was having difficulty tolerating POs.  While in the CDU she has been pain-free, +BM, and normal labs this AM.  VS: wnl, and abdominal exam unremarkable.  Impression:  while her physical exam and +BM is encouraging for resolution of her intraabdominal process she has yet to PO challenge.  Plan:  PO challenge, reassessment by Gen Surg, potassium replacement. Pt notes significant improvement in symptoms. Pain minimal.  Pt tolerated to solid meals today.  Potassium improved.  Surgery recommends discharge if pt able to tolerate PO.  Pt medically stable for discharge. Pt to followup with PMD, general surgery and gastroenterology (referral list provided).  Strict return precautions given.  Pt had BM in CDU.

## 2018-12-01 NOTE — ED CDU PROVIDER SUBSEQUENT DAY NOTE - ATTENDING CONTRIBUTION TO CARE
MD Kearns:  I have personally performed a face to face diagnostic evaluation on this patient with the PA.  I have reviewed the ACP note and agree with the history, exam, and plan of care, except as noted.  History and Exam by me shows 64 yo F, sent to the CDU for serial abdominal exams and PO challenge.  Patient presented to the ED c/o abdominal pain and N/V.  CTA abdomen demonstrated patent abdominal vasculature, and a questionable partial SBO vs ileus.  She had normal VS, and normal labs, w/exception of a slightly elevated lactate (3.3) which cleared s/p IVF.  However, she was having difficulty tolerating POs.  While in the CDU she has been pain-free, +BM, and normal labs this AM.  VS: wnl, and abdominal exam unremarkable.  Impression:  while her physical exam and +BM is encouraging for resolution of her intraabdominal process she has yet to PO challenge.  Plan:  PO challenge, reassessment by Gen Surg, potassium replacement.

## 2018-12-01 NOTE — PROGRESS NOTE ADULT - SUBJECTIVE AND OBJECTIVE BOX
Surgery Progress Note    Subjective:  Yesterday patient had emesis. RUQ U/S showed no cholelithiasis  Pt seen at bedside. Pt denies nausea but did not eat. Pt had gas and bowel movement. SHe has only a little pain.     Objective:  Gen: NAD  Resp: no increased work of breathing  Abd: s/nt/nd        T(C): 36.9 (18 @ 06:21), Max: 37.5 (18 @ 03:37)  HR: 67 (18 @ 06:21) (61 - 78)  BP: 135/88 (18 @ 06:21) (135/88 - 149/86)  RR: 16 (18 @ 06:21) (16 - 17)  SpO2: 100% (18 @ 06:21) (98% - 100%)      LABS                        11.7   4.69  )-----------( 205      ( 01 Dec 2018 07:30 )             35.9         139  |  102  |  10  ----------------------------<  132<H>  3.1<L>   |  25  |  0.80    Ca    8.8      01 Dec 2018 07:30    TPro  7.7  /  Alb  4.0  /  TBili  0.7  /  DBili  x   /  AST  18  /  ALT  17  /  AlkPhos  77  12    PT/INR - ( 2018 08:30 )   PT: 11.5 SEC;   INR: 1.03          PTT - ( 2018 08:30 )  PTT:29.6 SEC  Urinalysis Basic - ( 2018 10:19 )    Color: COLORLESS / Appearance: CLEAR / S.014 / pH: 8.5  Gluc: NEGATIVE / Ketone: NEGATIVE  / Bili: NEGATIVE / Urobili: NORMAL   Blood: NEGATIVE / Protein: NEGATIVE / Nitrite: NEGATIVE   Leuk Esterase: TRACE / RBC: 0-2 / WBC 0-2   Sq Epi: FEW / Non Sq Epi: x / Bacteria: NEGATIVE

## 2018-12-01 NOTE — ED CDU PROVIDER SUBSEQUENT DAY NOTE - HISTORY
CDU Initial Note HPI:  "64 y/o female pmh htn, pshx appendectomy, hysterectomy, c/o abd pain x1 day. Pt admits to sudden onset abd pain which woke her up from sleep. Pt admits to constant, mid abd pain, non radiating, no aggravating or relieving factors. Pt admits to nausea. Pt admits to having small BM yesterday, last normal BM x2 days ago. Pt admits to passing gas today. pt denies chest pain, sob, vomiting, numbness, tingling, weakness, dizziness, syncope, fever or chills.  Pt sen and examined by surgery in ER for CT findings of ileus vs partial SBO. Pt is non tender with no emesis and passing flatus. Pt sent to CDU for serial abd exams, IV fluids, repeat labs and PO trial"  In the interim, on 11/30 evening, pt attempted PO challenge with acute frequent belching and retching immediately following attempt at PO intake.  Pt. was treated with antiemetics and simethicone with improvement of symptoms.  Surgery came to reassess pt; plan in interim is for continued observation monitoring and supportive care; am labs will be drawn at 0600 hrs, and Surgery team will reassess pt in the am.  No other issues or c/o in the interim.  No further episodes of retching; pt is being kept NPO for now.

## 2018-12-01 NOTE — ED CDU PROVIDER SUBSEQUENT DAY NOTE - MEDICAL DECISION MAKING DETAILS
62 y/o female abd pain x1 day, CT concerning for ileus vs partial SBO- pain control, fluids, PO trial, reassess

## 2019-10-17 NOTE — ED PROVIDER NOTE - NSTIMEPROVIDERCAREINITIATE_GEN_ER
Medical Assistant/Nurse notes reviewed and accepted.   Problem List Reviewed.  Skin system in problem list updated.     Subjective:  Sunita Mendoza presents for inspection of surgical site(s)  following skin surgery.  She notes no problems at the surgical site(s).    Objective:  No significant redness, induration, tenderness or drainage noted at the surgical site(s).    Assessment:  Healing well following skin surgery, with no evidence of infection or other complication.    Plan:   Non-absorbable sutures removed.  Continue topical wound care until fully healed.  Long term followup for skin exam with Dr. Falcon.                30-Nov-2018 08:15

## 2023-08-11 NOTE — ED ADULT NURSE NOTE - BP NONINVASIVE DIASTOLIC (MM HG)
66 Carac Counseling:  I discussed with the patient the risks of Carac including but not limited to erythema, scaling, itching, weeping, crusting, and pain.